# Patient Record
Sex: FEMALE | Race: OTHER | HISPANIC OR LATINO
[De-identification: names, ages, dates, MRNs, and addresses within clinical notes are randomized per-mention and may not be internally consistent; named-entity substitution may affect disease eponyms.]

---

## 2017-03-07 ENCOUNTER — APPOINTMENT (OUTPATIENT)
Dept: PULMONOLOGY | Facility: CLINIC | Age: 57
End: 2017-03-07

## 2017-03-07 VITALS
OXYGEN SATURATION: 98 % | SYSTOLIC BLOOD PRESSURE: 140 MMHG | BODY MASS INDEX: 34.76 KG/M2 | HEART RATE: 87 BPM | DIASTOLIC BLOOD PRESSURE: 80 MMHG | RESPIRATION RATE: 16 BRPM | WEIGHT: 178 LBS

## 2017-03-07 DIAGNOSIS — R06.09 OTHER FORMS OF DYSPNEA: ICD-10-CM

## 2017-07-05 ENCOUNTER — APPOINTMENT (OUTPATIENT)
Dept: PULMONOLOGY | Facility: CLINIC | Age: 57
End: 2017-07-05

## 2017-07-05 VITALS
SYSTOLIC BLOOD PRESSURE: 130 MMHG | HEIGHT: 60 IN | WEIGHT: 182 LBS | DIASTOLIC BLOOD PRESSURE: 78 MMHG | BODY MASS INDEX: 35.73 KG/M2 | HEART RATE: 76 BPM | OXYGEN SATURATION: 98 %

## 2017-07-05 DIAGNOSIS — E66.9 OBESITY, UNSPECIFIED: ICD-10-CM

## 2017-07-05 DIAGNOSIS — G47.33 OBSTRUCTIVE SLEEP APNEA (ADULT) (PEDIATRIC): ICD-10-CM

## 2017-07-05 RX ORDER — INSULIN GLARGINE 100 [IU]/ML
100 INJECTION, SOLUTION SUBCUTANEOUS
Refills: 0 | Status: ACTIVE | COMMUNITY

## 2017-07-05 RX ORDER — GLIMEPIRIDE 2 MG/1
2 TABLET ORAL
Refills: 0 | Status: ACTIVE | COMMUNITY

## 2018-01-01 ENCOUNTER — OUTPATIENT (OUTPATIENT)
Dept: OUTPATIENT SERVICES | Facility: HOSPITAL | Age: 58
LOS: 1 days | End: 2018-01-01

## 2018-01-27 ENCOUNTER — EMERGENCY (EMERGENCY)
Facility: HOSPITAL | Age: 58
LOS: 1 days | End: 2018-01-27
Attending: EMERGENCY MEDICINE
Payer: MEDICAID

## 2018-01-27 VITALS — TEMPERATURE: 98 F

## 2018-01-27 PROCEDURE — 99285 EMERGENCY DEPT VISIT HI MDM: CPT | Mod: 25

## 2018-01-27 PROCEDURE — 92950 HEART/LUNG RESUSCITATION CPR: CPT

## 2018-01-27 RX ORDER — EPINEPHRINE 0.3 MG/.3ML
1 INJECTION INTRAMUSCULAR; SUBCUTANEOUS ONCE
Qty: 0 | Refills: 0 | Status: DISCONTINUED | OUTPATIENT
Start: 2018-01-27 | End: 2018-01-31

## 2018-01-27 NOTE — ED PROVIDER NOTE - CRITICAL CARE PROVIDED
documentation/additional history taking/direct patient care (not related to procedure)/consult w/ pt's family directly relating to pts condition

## 2018-01-27 NOTE — ED PROVIDER NOTE - OBJECTIVE STATEMENT
57 y/o female presents to the ED for code blue. According to EMS, pt suddenly collapsed as per family. CPR initiated by family and family activated EMS. 5 epi given by EMS. Upon arrival CPR in progress by KIRSTEN. PEA on monitor in ED. After US revealed no cardiac activity time of death called at 2006.

## 2018-01-27 NOTE — ED ADULT NURSE REASSESSMENT NOTE - NS ED NURSE REASSESS COMMENT FT1
VALUABLES- on patient at this time- two rings yellow in color one with multiple clear stones and 2nd with 1 clear stone 1 ring on left hand yellow in color with pink stone in center

## 2018-01-27 NOTE — ED PROVIDER NOTE - MEDICAL DECISION MAKING DETAILS
pcp dr boudreaux family updated on cause death cardiac u.s showing no cardiac activity epi given here acls prtoocl no rosc pt prnounced dead cpr performed family udated on death charge rn with family 40 minute downtime in field

## 2018-01-27 NOTE — DISCHARGE NOTE FOR THE EXPIRED PATIENT - HOSPITAL COURSE
please see code sheet documentation, call placed to medical examiner , awaiting call back. Family with patient belongings given 3 yellow rings 1 with tiny white stones and 1 with pink stone. As per family pt with hx of DM , HTN, high cholesterol. please see code sheet documentation, call placed to medical examiner , awaiting call back. Family with patient belongings given 3 yellow rings 1 with tiny white stones and 1 with pink stone. As per family pt with hx of DM , HTN, high cholesterol.  2150 spoke to BENJI Tovar from [facility]> office case not accepted

## 2018-01-31 DIAGNOSIS — R69 ILLNESS, UNSPECIFIED: ICD-10-CM
